# Patient Record
(demographics unavailable — no encounter records)

---

## 2022-07-20 LAB
INFLUENZA A: NOT DETECTED
INFLUENZA B: NOT DETECTED
SARS-COV-2: DETECTED

## 2022-10-19 NOTE — ED NOTES
ED Patient Summary       ;          [facility]  [address], [place], [address] [place] [address]  [phone]  Discharge Instructions (Patient)  Luara Yadav  :  2021                   MRN: [id number]                   FIN: [id number]  Reason For Visit: Fever; FEVER  Final Diagnosis: [id number]     Visit Date: 2022 06:01:00  Address: [address] [place] [id number]  Phone: [phone]     Emergency Department Providers:         Primary Physician:      [facility] would like to thank you for allowing us to assist you with your healthcare needs. The following includes patient education materials and information regarding your injury/illness. Follow-up Instructions: You were seen today on an emergency basis. Please contact your primary care doctor for a follow up appointment. If you received a referral to a specialist doctor, it is important you follow-up as instructed. It is important that you call your follow-up doctor to schedule and confirm the location of your next appointment. Your doctor may practice at multiple locations. The office location of your follow-up appointment may be different to the one written on your discharge instructions. If you do not have a primary care doctor, please call [phone] for help in finding a [place]. [facility] Provider. For help in finding a specialist doctor, please call [phone]. If your condition gets worse before your follow-up with your primary care doctor or specialist, please return to the Emergency Department. Coronavirus 2019 (COVID-19) Reminders:     Patients age 15 - 24, with parental consent, and over age 25 can make an appointment for a COVID-19 vaccine. Patients can contact their [facility] doctors' offices to schedule an appointment to receive the COVID-19 vaccine.  Patients who do not have a [place]. Alfonzo Cordero physician can call [phone] to schedule vaccination appointments. Follow Up Appointments:  Primary Care Provider:     Name: Valentina Gregorio     Phone: [phone]                 With: Address: When:   Follow up with primary care provider  Within 1 week   Comments:   Treat fever with ibuprofen and acetaminophen. Drink plenty of fluids. Follow-up with pediatrician if any further concerns. Return to [facility] if any change or worsening of condition. [address] SERVICES%>             Medications that have not changed  Other Medications  multivitamin with iron (Poly-Vi-Sol with Iron Drops) 1 Milliliter Oral (given by mouth) every day. Last Dose:____________________      Allergy Info: No Known Allergies     Discharge Additional Information          Discharge Patient 07/20/22 6:45:00 EDT      Patient Education Materials:        Ibuprofen Dosage Chart, Pediatric    Ibuprofen, also called Motrin? or Advil?, is a medicine used to relieve pain and fever in children. Before giving the medicine    Check the label on the bottle for the amount and strength (concentration) of ibuprofen. Determine the dosage by finding your child's weight below. The medicine can be given in liquid, chewable tablet, or standard tablet form. Each type may have a different concentration of medicine. Measure the dosage. To measure liquid, use the oral syringe or medicine cup that came with the bottle. Do not use household teaspoons or spoons. Do not give ibuprofen if your child is 7 months of age or younger unless instructed to do so by your child's health care provider. Dosage by weight    Weight: 12?17 lb (5.4?7.7 kg)     Infant concentrated drops (50 mg in 1.25 mL): 1.25 mL. Children's suspension liquid (100 mg in 5 mL): 2.5 mL. Children's or red-strength tablets or chewable tablets (100 mg tablets): Not recommended.       Weight: 18?23 lb (8.2?10.4 kg)     Infant concentrated drops (50 mg in 1.25 mL): 1.875 mL. Children's suspension liquid (100 mg in 5 mL): 4 mL. Children's or red-strength tablets or chewable tablets (100 mg tablets): Not recommended. Weight: 24?35 lb (10.9?15.9 kg)       Infant concentrated drops (50 mg in 1.25 mL): 2.5 mL. Children's suspension liquid (100 mg in 5 mL): 5 mL. Children's or red-strength tablets or chewable tablets (100 mg tablets): Not recommended. Weight: 36?47 lb (16.3?21.3 kg)       Infant concentrated drops (50 mg in 1.25 mL): 3.75 mL. Children's suspension liquid (100 mg in 5 mL): 7.5 mL. Children's or red-strength tablets or chewable tablets (100 mg tablets): Not recommended. Weight: 48?59 lb (21.8?26.8 kg)       Infant concentrated drops (50 mg in 1.25 mL): 5 mL. Children's suspension liquid (100 mg in 5 mL): 10 mL. Children's or red-strength tablets or chewable tablets (100 mg tablets): 2 tablets. Weight: 60?71 lb (27.2?32.2 kg)       Infant concentrated drops (50 mg in 1.25 mL): Not recommended. Children's suspension liquid (100 mg in 5 mL): 12.5 mL. Children's or red-strength tablets or chewable tablets (100 mg tablets): 2? tablets. Weight: 72? 95 lb (32.7?43.1 kg)       Infant concentrated drops (50 mg in 1.25 mL): Not recommended. Children's suspension liquid (100 mg in 5 mL): 15 mL. Children's or red-strength tablets or chewable tablets (100 mg tablets): 3 tablets. Weight: 96 lb and over (43.5 kg and over)     Infant concentrated drops (50 mg in 1.25 mL): Not recommended. Children's suspension liquid (100 mg in 5 mL): 20 mL. Children's or red-strength tablets or chewable tablets (100 mg tablets): 4 tablets. Follow these instructions at home:     Repeat dosage every 6?8 hours as needed, or as recommended by your child's health care provider. Do not give more than 4 doses in 24 hours.      Do not give your child aspirin unless you are told to do so by your child's pediatrician or cardiologist. Aspirin has been linked to a serious medical reaction called Reye's syndrome. Summary     Ibuprofen is a medicine used to relieve pain and fever in children. Determine the correct dosage for your child based on his or her weight. Repeat dosage every 6?8 hours as needed, or as recommended by your child's health care provider. Do not give more than 4 doses in 24 hours. This information is not intended to replace advice given to you by your health care provider. Make sure you discuss any questions you have with your health care provider. Document Revised: 2021 Document Reviewed: 04/06/2018  [employer] Patient Education ? [address].       Acetaminophen Dosage Chart, Pediatric    Acetaminophen, also called Tylenol?, is a medicine used to relieve pain and fever in children. Before giving the medicine    Check the label on the bottle for the amount and strength (concentration) of acetaminophen. Concentrated infant acetaminophen drops (80 mg per 1 mL) are no longer made or sold in the [place] but they are available in other countries including [place] Determine the dosage by finding your child's weight below. The medicine can be given in liquid, chewable tablet, or dissolving powder form. Each type may have a different concentration of medicine. Measure the dosage. To measure liquid, use the oral syringe or medicine cup that came with the bottle. Do not use household teaspoons or spoons. Do not give acetaminophen if your child is 16 weeks of age or younger unless instructed to do so by your child's health care provider. Dosage by weight    Weight: 6?11 lb (2.7?5 kg)     Suspension liquid (160 mg per 5 mL): 1.25 mL. Chewable tablets (160 mg tablets): Not recommended. Dissolving powder in packets (160 mg per powder): Not recommended.       Weight 12?17 lb (5.4?7.7 kg) Suspension liquid (160 mg per 5 mL): 2.5 mL. Chewable tablets (160 mg tablets): Not recommended. Dissolving powder in packets (160 mg per powder): Not recommended. Weight 18?23 lb (8.2?10.4 kg)     Suspension liquid (160 mg per 5 mL): 3.75 mL. Chewable tablets (160 mg tablets): Not recommended. Dissolving powder in packets (160 mg per powder): Not recommended. Weight: 24?35 lb (10.9?15.9 kg)       Suspension liquid (160 mg per 5 mL): 5 mL. Chewable tablets (160 mg tablets): 1 tablet. Dissolving powder in packets (160 mg per powder): Not recommended. Weight: 36?47 lb (16.3?21.3 kg)       Suspension liquid (160 mg per 5 mL): 7.5 mL. Chewable tablets (160 mg tablets): 1? tablets. Dissolving powder in packets (160 mg per powder): Not recommended. Weight: 48?59 lb (21.8?26.8 kg)       Suspension liquid (160 mg per 5 mL): 10 mL. Chewable tablets (160 mg tablets): 2 tablets. Dissolving powder in packets (160 mg per powder): 2 powders. Weight: 60?71 lb (27.2?32.2 kg)       Suspension liquid (160 mg per 5 mL): 12.5 mL. Chewable tablets (160 mg tablets): 2? tablets. Dissolving powder in packets (160 mg per powder): 2 powders. Weight: 72? 95 lb (32.7?43.1 kg)       Suspension liquid (160 mg per 5 mL): 15 mL. Chewable tablets (160 mg tablets): 3 tablets. Dissolving powder in packets (160 mg per powder): 3 powders. Weight: 96 lb and over (43.6 kg and over)     Suspension liquid (160 mg per 5 mL): 20 mL. Chewable tablets (160 mg tablets): 4 tablets. Dissolving powder in packets (160 mg per powder): Not recommended. Follow these instructions at home:     Repeat the dosage every 4?6 hours as needed, or as recommended by your child's health care provider. Do not give more than 5 doses in 24 hours. Do not give more than one medicine containing acetaminophen at the same time.  Taking too much acetaminophen can lead to significant problems such as liver damage. Do not give your child aspirin unless you are told to do so by your child's pediatrician or cardiologist. Aspirin has been linked to a serious medical reaction called Reye's syndrome. Summary     Acetaminophen is commonly used to relieve pain and fever in children. Determine the correct dosage for your child based on his or her weight. Do not give more than one medicine containing acetaminophen at the same time. Repeat the dosage every 4?6 hours as needed, or as recommended by your child's health care provider. Do not give more than 5 doses in 24 hours. This information is not intended to replace advice given to you by your health care provider. Make sure you discuss any questions you have with your health care provider. Document Revised: 2021 Document Reviewed: 08/01/2018  Elsevier Patient Education ? [facility] [facility] Testing, Precautions, & Infection     x Your COVID-19 lab test result is POSITIVE. Your COVID-19 lab result is positive (detected) for the COVID-19 infection. Please follow all other discharge instructions given to you by your healthcare provider. Please follow the quarantine/isolation instructions on page 2.          **** The quickest way to view or print your own test result is by the Patient Portal at [email or web address] see below for further Patient Portal details. Note: In reviewing your COVID-19 result on the patient portal (under the results tab) you may notice a variation in the test name that includes, Coronavirus, cNoV, Corona, SARS, or COV2. These all relate to a COVID-19 test that you had performed at a [address] location.          CDC Quarantine/Isolation Guidelines  q If you were exposed to COVID-19 and are NOT UP-TO-DATE on COVID-19 vaccinations  · Stay home and quarantine until you receive your test result  If negative, you may return to normal activities  If your test result is positive, follow the instructions below  q If you were exposed to COVID-19 and are UP-TO-DATE on COVID-19 vaccinations  · You do not need to stay home unless you develop symptoms  · If you have symptoms, isolate at home until you receive your test result  If negative, you may return to normal activities  If your test result is positive, follow the instructions below  q If you were exposed to COVID-19 and had confirmed COVID-19 within the   past 90 days  · You do not need to stay home unless you develop symptoms  · If you have symptoms, isolate at home until you receive your test result  If negative, you may return to normal activities  If your test result is positive, follow the instructions below    q If your COVID-19 test is POSITIVE  · Stay home and isolate from others for at least 5 days  · You may end your isolation after 5 days if your symptoms are improving and you have gone at least 24 hours without a fever (without taking Tylenol, ibuprofen, or any other medications to reduce your fever)continue your isolation for a total of 10 days if your symptoms are not improving or you still have a fever after 5 days  · You should continue to wear a mask for 10 full days any time you are around others (inside or outside of your home)  · Avoid sharing confined spaces with others as much as possible. If you live in your home with other people, consider keeping a separate bedroom and bathroom just for your use    How to Access the [facility] Patient Portal  1. Go to: [email or web address]  2. You want the option of:  [facility]   If you do not have an account and are visiting the portal for the first time, select Sign up now. If you already have an account, select Log into [facility].        3. If you are signing up for a new account you will fill out a Self-Enrollment for [facility] page in which you will securely enter your first, last name, date of birth, social security number, and an identity verification prompt. *Tip:  when filling out the Self-Enrollment for [facility] enter your name using the same spelling that is on file with your physicians office or hospital. Once this page is filled out, hit the purple Next button at the bottom of the page. 4. Verify your information and click the purple Next Create Your Account button. 5. After identity verification, you will create your username and password for your [facility] Patient Portal. You will then click the green Create Account button. 6. Once your account has been created, you will be taken back to a login screen. Log in with the username and password that you created in step 5. If creating an account for a minor child, contact Medical Records at [phone] to receive a personal email invitation to enroll your child. Medical records is open M-F 8am-4:30pm. If you contact them after hours, select option 3 to leave a message and you will receive a call back the next business day. ! If you are having issues logging into or accessing your account, contact [employer] at [phone] available 7 days a week, 24 hours a day.           CDC COVID-19 Resources  For more information, visit  [email or web address]                    (Scan these codes with an Apple or Android device)                       CDC Quarantine/Isolation                       What to do when                           COVID-19 Quarantine           Guidelines                                        when you are sick                                  VS Isolation What is COVID-19? COVID-19 stands for \"coronavirus disease 2019. \" It is caused by a virus called SARS-CoV-2. The virus first appeared in late 2019 and quickly spread around the world. People with COVID-19 can have fever, cough, trouble breathing, and other symptoms. Problems with breathing happen when the infection affects the lungs and causes pneumonia. Most people who get COVID-19 will not get severely ill. But some do. In many areas, people have been told to stay home and away from other people. This is to try to slow the spread of the virus. How is COVID-19 Spread? The virus that causes COVID-19 mainly spreads from person to person. This usually happens when an infected person coughs, sneezes, or talks near other people. The virus can be passed easily between people who live together. But it can also spread at gatherings where people are talking close together, shaking hands, hugging, sharing food, or even singing together. Doctors also think it is possible to get sick if you touch a surface that has the virus on it and then touch your mouth, nose, or eyes. A person can be infected, and spread the virus to others, even without having any symptoms. This is why keeping people apart is one of the best ways to slow the spread. It is also possible for the virus to spread from an infected person to an animal, like a pet. But this seems to be uncommon. There is no evidence that a person could get the virus from a pet. Experts do not think the virus is spread through food like some other viruses. There is also no evidence that it can be spread through the water in a pool or hot tub. But because the virus can spread when people are close together, things like swimming in a crowded pool are still risky. What are the Symptoms of COVID-19? Symptoms usually start 4 or 5 days after a person is infected with the virus. But in some people, it can take up to 2 weeks for symptoms to appear.  Some people never show symptoms at all. When symptoms do happen, they can include:  · Fever    Shaking chills  Problems with sense of smell   · Cough               Muscle aches           or taste  · Trouble Breathing  Headache  · Feeling tired    Sore throat  Some people have digestive problems like nausea or diarrhea. There have also been some reports of rashes or other skin symptoms. For example, some people with COVID-19 get reddish-purple spots on their fingers or toes. But it's not clear why or how often this happens. For most people, symptoms will get better within a few weeks. But in others, COVID-19 can lead to serious problems like pneumonia, not getting enough oxygen, heart problems, or even death. This risk gets higher as people get older. It is also higher in people who have other health problems like serious heart disease, chronic kidney disease, type 2 diabetes, chronic obstructive pulmonary disease (COPD), sickle cell disease, or obesity. People who have a weak immune system for other reasons (for example, HIV infection or certain medicines), asthma, cystic fibrosis, type 1 diabetes, or high blood pressure might also be at higher risk for serious problems. What should I do if I have symptoms? If you have a fever, cough, trouble breathing, or other symptoms of COVID-19, call your doctor or nurse. They will ask about your symptoms. They might also ask about any recent travel and whether you have been around anyone who might be sick. If your symptoms are not severe, it is best to call before you go in. The staff can tell you what to do and whether you need to be seen in person. Many people with only mild symptoms should stay home and avoid other people until they get better. If you do need to go to the clinic or hospital, cover your nose and mouth with cloth. This helps protect other people. The staff might also have you wait someplace away from other people.   If you are severely ill and need to go to the clinic or hospital right away, you should still call ahead if possible. This way the staff can care for you while taking steps to protect others. If you think you are having a medical emergency, dial 9-1-1.          ---------------------------------------------------------------------------------------------------------------------  [facility] allows patients to review your COVID and other test results as well as discharge documents from any [facility], Emergency Department, surgical center or outpatient lab. Test results are typically available 36 hours after the test is completed. [address] encourages you to self-enroll in the [facility] Patient Portal.     To begin your self-enrollment process, please visit [email or web address] Under [facility], click on Sign up now. NOTE: You must be 16 years and older to use [facility] Self-Enroll online. If you are a parent, caregiver, or guardian; you need an invite to access your childs or dependents health records. To obtain an invite, contact the Medical Records department at [phone] Monday through Friday, 8-4:30, select option 3 . If we receive your call afterhours, we will return your call the next business day. If you have issues trying to create or access your account, contact [employer] at [phone] available 7 days a week 24 hours a day.      Comment:

## 2022-10-19 NOTE — ED NOTES
ED Patient Education Note     ;Patient Education Materials Follows:                  COVID-19 Testing, Precautions, & Infection     x Your COVID-19 lab test result is POSITIVE. Your COVID-19 lab result is positive (detected) for the COVID-19 infection. Please follow all other discharge instructions given to you by your healthcare provider. Please follow the quarantine/isolation instructions on page 2.          **** The quickest way to view or print your own test result is by the Patient Portal at [email or web address] see below for further Patient Portal details. Note: In reviewing your COVID-19 result on the patient portal (under the results tab) you may notice a variation in the test name that includes, Coronavirus, cNoV, Corona, SARS, or COV2. These all relate to a COVID-19 test that you had performed at a [address] location.          CDC Quarantine/Isolation Guidelines  q If you were exposed to COVID-19 and are NOT UP-TO-DATE on COVID-19 vaccinations  · Stay home and quarantine until you receive your test result  If negative, you may return to normal activities  If your test result is positive, follow the instructions below  q If you were exposed to COVID-19 and are UP-TO-DATE on COVID-19 vaccinations  · You do not need to stay home unless you develop symptoms  · If you have symptoms, isolate at home until you receive your test result  If negative, you may return to normal activities  If your test result is positive, follow the instructions below  q If you were exposed to COVID-19 and had confirmed COVID-19 within the   past 90 days  · You do not need to stay home unless you develop symptoms  · If you have symptoms, isolate at home until you receive your test result  If negative, you may return to normal activities  If your test result is positive, follow the instructions below    q If your COVID-19 test is POSITIVE  · Stay home and isolate from others for at least 5 days  · You may end your isolation after 5 days if your symptoms are improving and you have gone at least 24 hours without a fever (without taking Tylenol, ibuprofen, or any other medications to reduce your fever)continue your isolation for a total of 10 days if your symptoms are not improving or you still have a fever after 5 days  · You should continue to wear a mask for 10 full days any time you are around others (inside or outside of your home)  · Avoid sharing confined spaces with others as much as possible. If you live in your home with other people, consider keeping a separate bedroom and bathroom just for your use    How to Access the [facility] Patient Portal  1. Go to: [email or web address]  2. You want the option of:  [facility]   If you do not have an account and are visiting the portal for the first time, select Sign up now. If you already have an account, select Log into [facility]. 3. If you are signing up for a new account you will fill out a Self-Enrollment for [facility] page in which you will securely enter your first, last name, date of birth, social security number, and an identity verification prompt. *Tip:  when filling out the Self-Enrollment for [facility] enter your name using the same spelling that is on file with your physicians office or hospital. Once this page is filled out, hit the purple Next button at the bottom of the page. 4. Verify your information and click the purple Next Create Your Account button. 5. After identity verification, you will create your username and password for your [facility] Patient Portal. You will then click the green Create Account button. 6. Once your account has been created, you will be taken back to a login screen. Log in with the username and password that you created in step 5.    If creating an account for a minor child, contact Medical Records at [phone] to receive a where people are talking close together, shaking hands, hugging, sharing food, or even singing together. Doctors also think it is possible to get sick if you touch a surface that has the virus on it and then touch your mouth, nose, or eyes. A person can be infected, and spread the virus to others, even without having any symptoms. This is why keeping people apart is one of the best ways to slow the spread. It is also possible for the virus to spread from an infected person to an animal, like a pet. But this seems to be uncommon. There is no evidence that a person could get the virus from a pet. Experts do not think the virus is spread through food like some other viruses. There is also no evidence that it can be spread through the water in a pool or hot tub. But because the virus can spread when people are close together, things like swimming in a crowded pool are still risky. What are the Symptoms of COVID-19? Symptoms usually start 4 or 5 days after a person is infected with the virus. But in some people, it can take up to 2 weeks for symptoms to appear. Some people never show symptoms at all. When symptoms do happen, they can include:  · Fever    Shaking chills  Problems with sense of smell   · Cough               Muscle aches           or taste  · Trouble Breathing  Headache  · Feeling tired    Sore throat  Some people have digestive problems like nausea or diarrhea. There have also been some reports of rashes or other skin symptoms. For example, some people with COVID-19 get reddish-purple spots on their fingers or toes. But it's not clear why or how often this happens. For most people, symptoms will get better within a few weeks. But in others, COVID-19 can lead to serious problems like pneumonia, not getting enough oxygen, heart problems, or even death. This risk gets higher as people get older.  It is also higher in people who have other health problems like serious heart disease, chronic kidney disease, type 2 diabetes, chronic obstructive pulmonary disease (COPD), sickle cell disease, or obesity. People who have a weak immune system for other reasons (for example, HIV infection or certain medicines), asthma, cystic fibrosis, type 1 diabetes, or high blood pressure might also be at higher risk for serious problems. What should I do if I have symptoms? If you have a fever, cough, trouble breathing, or other symptoms of COVID-19, call your doctor or nurse. They will ask about your symptoms. They might also ask about any recent travel and whether you have been around anyone who might be sick. If your symptoms are not severe, it is best to call before you go in. The staff can tell you what to do and whether you need to be seen in person. Many people with only mild symptoms should stay home and avoid other people until they get better. If you do need to go to the clinic or hospital, cover your nose and mouth with cloth. This helps protect other people. The staff might also have you wait someplace away from other people. If you are severely ill and need to go to the clinic or hospital right away, you should still call ahead if possible. This way the staff can care for you while taking steps to protect others. If you think you are having a medical emergency, dial [phone]. Pediatrics     Ibuprofen Dosage Chart, Pediatric    Ibuprofen, also called Motrin? or Advil?, is a medicine used to relieve pain and fever in children. Before giving the medicine    Check the label on the bottle for the amount and strength (concentration) of ibuprofen. Determine the dosage by finding your child's weight below. The medicine can be given in liquid, chewable tablet, or standard tablet form. Each type may have a different concentration of medicine. Measure the dosage. To measure liquid, use the oral syringe or medicine cup that came with the bottle. Do not use household teaspoons or spoons.     Do not give ibuprofen if your child is 7 months of age or younger unless instructed to do so by your child's health care provider. Dosage by weight    Weight: 12?17 lb (5.4?7.7 kg)     Infant concentrated drops (50 mg in 1.25 mL): 1.25 mL. Children's suspension liquid (100 mg in 5 mL): 2.5 mL. Children's or red-strength tablets or chewable tablets (100 mg tablets): Not recommended. Weight: 18?23 lb (8.2?10.4 kg)     Infant concentrated drops (50 mg in 1.25 mL): 1.875 mL. Children's suspension liquid (100 mg in 5 mL): 4 mL. Children's or red-strength tablets or chewable tablets (100 mg tablets): Not recommended. Weight: 24?35 lb (10.9?15.9 kg)       Infant concentrated drops (50 mg in 1.25 mL): 2.5 mL. Children's suspension liquid (100 mg in 5 mL): 5 mL. Children's or red-strength tablets or chewable tablets (100 mg tablets): Not recommended. Weight: 36?47 lb (16.3?21.3 kg)       Infant concentrated drops (50 mg in 1.25 mL): 3.75 mL. Children's suspension liquid (100 mg in 5 mL): 7.5 mL. Children's or red-strength tablets or chewable tablets (100 mg tablets): Not recommended. Weight: 48?59 lb (21.8?26.8 kg)       Infant concentrated drops (50 mg in 1.25 mL): 5 mL. Children's suspension liquid (100 mg in 5 mL): 10 mL. Children's or red-strength tablets or chewable tablets (100 mg tablets): 2 tablets. Weight: 60?71 lb (27.2?32.2 kg)       Infant concentrated drops (50 mg in 1.25 mL): Not recommended. Children's suspension liquid (100 mg in 5 mL): 12.5 mL. Children's or red-strength tablets or chewable tablets (100 mg tablets): 2? tablets. Weight: 72? 95 lb (32.7?43.1 kg)       Infant concentrated drops (50 mg in 1.25 mL): Not recommended. Children's suspension liquid (100 mg in 5 mL): 15 mL. Children's or red-strength tablets or chewable tablets (100 mg tablets): 3 tablets.       Weight: 96 lb and over (43.5 kg and over) Infant concentrated drops (50 mg in 1.25 mL): Not recommended. Children's suspension liquid (100 mg in 5 mL): 20 mL. Children's or red-strength tablets or chewable tablets (100 mg tablets): 4 tablets. Follow these instructions at home:     Repeat dosage every 6?8 hours as needed, or as recommended by your child's health care provider. Do not give more than 4 doses in 24 hours. Do not give your child aspirin unless you are told to do so by your child's pediatrician or cardiologist. Aspirin has been linked to a serious medical reaction called Reye's syndrome. Summary     Ibuprofen is a medicine used to relieve pain and fever in children. Determine the correct dosage for your child based on his or her weight. Repeat dosage every 6?8 hours as needed, or as recommended by your child's health care provider. Do not give more than 4 doses in 24 hours. This information is not intended to replace advice given to you by your health care provider. Make sure you discuss any questions you have with your health care provider. Document Revised: 2021 Document Reviewed: 04/06/2018  [employer] Patient Education ? [address]         Acetaminophen Dosage Chart, Pediatric    Acetaminophen, also called Tylenol?, is a medicine used to relieve pain and fever in children. Before giving the medicine    Check the label on the bottle for the amount and strength (concentration) of acetaminophen. Concentrated infant acetaminophen drops (80 mg per 1 mL) are no longer made or sold in the [place] but they are available in other countries including [place] Determine the dosage by finding your child's weight below. The medicine can be given in liquid, chewable tablet, or dissolving powder form. Each type may have a different concentration of medicine. Measure the dosage. To measure liquid, use the oral syringe or medicine cup that came with the bottle.  Do not use household teaspoons or spoons. Do not give acetaminophen if your child is 16 weeks of age or younger unless instructed to do so by your child's health care provider. Dosage by weight    Weight: 6?11 lb (2.7?5 kg)     Suspension liquid (160 mg per 5 mL): 1.25 mL. Chewable tablets (160 mg tablets): Not recommended. Dissolving powder in packets (160 mg per powder): Not recommended. Weight 12?17 lb (5.4?7.7 kg)     Suspension liquid (160 mg per 5 mL): 2.5 mL. Chewable tablets (160 mg tablets): Not recommended. Dissolving powder in packets (160 mg per powder): Not recommended. Weight 18?23 lb (8.2?10.4 kg)     Suspension liquid (160 mg per 5 mL): 3.75 mL. Chewable tablets (160 mg tablets): Not recommended. Dissolving powder in packets (160 mg per powder): Not recommended. Weight: 24?35 lb (10.9?15.9 kg)       Suspension liquid (160 mg per 5 mL): 5 mL. Chewable tablets (160 mg tablets): 1 tablet. Dissolving powder in packets (160 mg per powder): Not recommended. Weight: 36?47 lb (16.3?21.3 kg)       Suspension liquid (160 mg per 5 mL): 7.5 mL. Chewable tablets (160 mg tablets): 1? tablets. Dissolving powder in packets (160 mg per powder): Not recommended. Weight: 48?59 lb (21.8?26.8 kg)       Suspension liquid (160 mg per 5 mL): 10 mL. Chewable tablets (160 mg tablets): 2 tablets. Dissolving powder in packets (160 mg per powder): 2 powders. Weight: 60?71 lb (27.2?32.2 kg)       Suspension liquid (160 mg per 5 mL): 12.5 mL. Chewable tablets (160 mg tablets): 2? tablets. Dissolving powder in packets (160 mg per powder): 2 powders. Weight: 72? 95 lb (32.7?43.1 kg)       Suspension liquid (160 mg per 5 mL): 15 mL. Chewable tablets (160 mg tablets): 3 tablets. Dissolving powder in packets (160 mg per powder): 3 powders. Weight: 96 lb and over (43.6 kg and over)     Suspension liquid (160 mg per 5 mL): 20 mL.      Chewable tablets (160 mg tablets): 4 tablets. Dissolving powder in packets (160 mg per powder): Not recommended. Follow these instructions at home:     Repeat the dosage every 4?6 hours as needed, or as recommended by your child's health care provider. Do not give more than 5 doses in 24 hours. Do not give more than one medicine containing acetaminophen at the same time. Taking too much acetaminophen can lead to significant problems such as liver damage. Do not give your child aspirin unless you are told to do so by your child's pediatrician or cardiologist. Aspirin has been linked to a serious medical reaction called Reye's syndrome. Summary     Acetaminophen is commonly used to relieve pain and fever in children. Determine the correct dosage for your child based on his or her weight. Do not give more than one medicine containing acetaminophen at the same time. Repeat the dosage every 4?6 hours as needed, or as recommended by your child's health care provider. Do not give more than 5 doses in 24 hours. This information is not intended to replace advice given to you by your health care provider. Make sure you discuss any questions you have with your health care provider. Document Revised: 2021 Document Reviewed: 08/01/2018  [employer] Patient Education ?  [address] [address].

## 2022-10-19 NOTE — ED NOTES
ED Triage Note       ED Triage Pediatric Entered On:  7/20/2022 6:09 EDT    Performed On:  7/20/2022 6:05 EDT by Rosibel BRINK               Triage Pediatric 8.6.19   Chief Complaint :   per grandma, pt has been burning up all night, not sleeping well, some congestion. tylenol at midnight   [employer] Mode of Arrival :   Private vehicle   Infectious Disease Documentation :   Document assessment   Minor Treatment Consent :   Obtained   Accompanied By :   Chidi Olmedo   Pregnancy Status :   N/A   Temperature Axillary :   38.6 degC(Converted to: 101.5 degF)  (>HHI)    Heart Rate Monitored :   186 bpm (HI)    Respiratory Rate :   34 br/min (HI)    SpO2 :   100 %   Oxygen Therapy :   Room air   Numeric Rating Pain Scale :   0 = No pain   ED Peds Weight :   Document assessment   Rosibel BRINK - 7/20/2022 6:05 EDT   DCP GENERIC CODE   Tracking Acuity :   3   Tracking Group :   [address] [place] Mayra Hutchinsonsol BRINK - 7/20/2022 6:05 EDT   ED General Section :   Document assessment   ED Allergies Section :   Document assessment   ED Reason for Visit Section :   Document assessment   ED Quick Assessment :   Patient appears awake, alert, oriented to baseline. Skin warm and dry. Moves all extremities. Respiration even and unlabored. Appears in no apparent distress. Rosibel BRINK - 7/20/2022 6:05 EDT   ID Risk Screen Symptoms   Recent Travel History :   No recent travel   TB Symptom Screen :   No symptoms   Last 90 days COVID-19 ID :   No   Close Contact with COVID-19 ID :   No   Last 14 days COVID-19 ID :   No   Elias BRINK - 7/20/2022 6:05 EDT   Allergies   (As Of: 7/20/2022 06:09:51 EDT)   Allergies (Active)   No Known Allergies  Estimated Onset Date:   Unspecified ; Created By:   Pearl Finney RN, Katey BROWN; Reaction Status:   Active ; Category:   Drug ; Substance:   No Known Allergies ; Type:    Allergy ; Updated By:   Kathrine Cochran; Reviewed Date:   7/20/2022 6:09 EDT        Consent to Treat a Minor   Minor Treatment Consent Given By :   Guardian   Method of Consent for Minor Treatment : In person   [facility] R-RN - 7/20/2022 6:05 EDT   Psycho-Social   Last 3 mo, thoughts killing self/others :   NA - Pediatric pt OR unable to answer   Right click within box for Suspected Abuse policy link. :   None   Feels Safe Where Live :   Unable to obtain   ED Behavioral Activity Rating Scale :   4 - Quiet and awake (normal level of activity)   Elias BRINK - 7/20/2022 6:05 EDT   ED Reason for Visit   (As Of: 7/20/2022 06:09:51 EDT)   Diagnoses(Active)    Fever  Date:   7/20/2022 ; Diagnosis Type:   Reason For Visit ; Confirmation:   Complaint of ; Clinical Dx:    Fever ; Classification:   Medical ; Clinical Service:   Emergency medicine ; Code:   PNED ; Probability:   0 ; Diagnosis Code:   [id number]        Peds Weight 8.6.19   Dosing Weight Obtained By :   Measured   Weight Dosing :   11.57 kg(Converted to: 25 lb 8 oz)    Elias BRINK  7/20/2022 6:05 EDT

## 2022-10-19 NOTE — DISCHARGE SUMMARY
ED Clinical Summary                         [facility]  [address]  [place], [place], [address] [phone]           PERSON INFORMATION  Name: Edita Christensen Age:  15 Months : 2021   Sex: Male Language: English PCP: Ruta Krabbe   Marital Status:  Single Phone: [phone] Med Service: MED-Medicine   MRN:  [id number] Acct# [[id number]] Arrival: 2022 06:01:00   Visit Reason: Fever; FEVER Acuity: 3 LOS: 000 00:54   Address:       [facility] [address]  Diagnosis:      [id number]  Printed Prescriptions: Allergies      No Known Allergies      Medications Administered During Visit:                  Medication Dose Route   ibuprofen 115.7 mg Oral       Patient Medication List:              multivitamin with iron (Poly-Vi-Sol with Iron Drops) 1 Milliliter Oral (given by mouth) every day. Major Tests and Procedures: The following procedures and tests were performed during your ED visit. COMMONPROCEDURES%>  COMMON PROCEDURESCOMMENTS%>          Laboratory Orders  Name Status Details   [facility] Completed Nasopharyngeal Swab, Stat, ST - Stat, 22 6:11:00 EDT, 22 6:11:00 EDT, Nurse collect, BURNS, Nikki Carrel, Print label Y/N               Radiology Orders  No radiology orders were placed. Patient Care Orders  Name Status Details   COVID-19 Status Ordered 22 6:11:37 EDT, NOT VALID FOR pharmacy, laboratory, radiology. , 22 6:11:37 EDT, COVID-19 Detected   Discharge Patient Ordered 22 6:45:00 EDT   ED Assessment Pediatric Completed 22 6:09:52 EDT, 22 6:09:52 EDT   ED Secondary Triage Completed 22 6:09:52 EDT, 22 6:09:52 EDT   ED Triage Pediatric Completed 22 6:01:29 EDT, 22 6:01:29 EDT   Notify Provider Completed 22 6:11:37 EDT, This message can only be seen by Nursing, it is not visible to Pharmacy, Laboratory, or Radiology. , 22 6:11:37 EDT   Patient Isolation Ordered 07/20/22 6:11:00 EDT, Contact and Airborne, Constant Indicator             PROVIDER INFORMATION               Provider Role Assigned Anuja Conrad [facility] ED Provider 7/20/2022 98:79:63    [facility] ED Nurse 7/20/2022 06:10:27        Attending Physician:  Nicholas VALLE     Admit Doc  HOLLOWAYBETTE     Consulting Doc       VITALS INFORMATION  Vital Sign Triage Latest   Temp Oral ORAL_1%> ORAL%>   Temp Temporal TEMPORAL_1%> TEMPORAL%>   Temp Intravascular INTRAVASCULAR_1%> INTRAVASCULAR%>   Temp Axillary AXILLARY_1%>38.6 degC AXILLARY%>37.2 degC   Temp Rectal RECTAL_1%> RECTAL%>   02 Sat 100 % 97 %   Respiratory Rate RATE_1%>34 br/min RATE%>26 br/min   Peripheral Pulse Rate PULSE RATE_1%>185 bpm PULSE RATE%>185 bpm   Apical Heart Rate HEART RATE_1%> HEART RATE%>   Blood Pressure BLOOD PRESSURE_1%>/ BLOOD PRESSURE_1%> BLOOD PRESSURE%> / BLOOD PRESSURE%>                 Immunizations      No Immunizations Documented This Visit          DISCHARGE INFORMATION   Discharge Disposition: H Outpt-Sent Home   Discharge Location:    Home   Discharge Date and Time:    7/20/2022 06:55:11   ED Checkout Date and Time:    7/20/2022 06:55:11     DEPART REASON INCOMPLETE INFORMATION               Depart Action Incomplete Reason   Interactive View/I&O MD Decision to leave incomplete               Problems      No Problems Documented              Smoking Status      No Smoking Status Documented         PATIENT EDUCATION INFORMATION  Instructions:       Ibuprofen Dosage Chart, Pediatric; Acetaminophen Dosage Chart, Pediatric;   COVID Discharge Instructions positive results (Custom)     Follow up:                    With: Address: When:   Follow up with primary care provider  Within 1 week   Comments:   Treat fever with ibuprofen and acetaminophen. Drink plenty of fluids. Follow-up with pediatrician if any further concerns.  [facility] any change or worsening of condition. ED PROVIDER DOCUMENTATION     Patient:   Mihaela Anand             MRN: [id number]            FIN: [id number]               Age:   12 months     Sex:  Male     :  2021   Associated Diagnoses:   COVID-19   Author:   Valery HOLLOWAY      Basic Information   Time seen: Provider Seen (ST)   ED Provider/Time:    BETTE HOLLOWAY / 2022 06:08  . Additional information: Chief Complaint from Nursing Triage Note   Chief Complaint  Chief Complaint: per grandma, pt has been burning up all night, not sleeping well, some congestion. tylenol at midnight (22 06:05:00). History of Present Illness   The patient presents with fever. The onset was 1  days ago. Patient is a 15month-old male who presents in the company of his grandmother. 1 to 2-day history of nasal congestion, cough, runny nose. Last night began running fever. No definitive sick contacts. No difficulty breathing. Oxygenating well. Last dose with antipyretics greater than 6 hours ago. No skin rash. No vomiting. No complaints of abdominal pain. .        Review of Systems   Constitutional symptoms:  Fever. Skin symptoms:  Negative except as documented in HPI. Eye symptoms:  Negative except as documented in HPI. ENMT symptoms:  Nasal congestion. Respiratory symptoms:  Negative except as documented in HPI. Cardiovascular symptoms:  Negative except as documented in HPI. Gastrointestinal symptoms:  Negative except as documented in HPI. Genitourinary symptoms   Musculoskeletal symptoms:  Negative except as documented in HPI. Neurologic symptoms:  Negative except as documented in HPI. Psychiatric symptoms:  Negative except as documented in HPI. Endocrine symptoms:  Negative except as documented in HPI. Hematologic/Lymphatic symptoms:  Negative except as documented in HPI. Allergy/immunologic symptoms:  Negative except as documented in HPI. Health Status   Allergies: Allergic Reactions (Selected)  No Known Allergies. Medications:  (Selected)   Documented Medications  Documented  Poly-Vi-Sol with Iron Drops: 1 mL, Oral, Daily, 0 Refill(s). Past Medical/ Family/ Social History   Surgical history:    No active procedure history items have been selected or recorded., Reviewed as documented in chart. Family history: Reviewed as documented in chart. Social history: Reviewed as documented in chart. Problem list:    No qualifying data available  , per nurse's notes. Physical Examination               Vital Signs   Vital Signs   7/20/2022 6:11 EDT Peripheral Pulse Rate 185 bpm  HI    Heart Rate Monitored 186 bpm  HI    Respiratory Rate 32 br/min  HI    SpO2 100 %   7/20/2022 6:05 EDT Temperature Axillary 38.6 degC  >HHI    Heart Rate Monitored 186 bpm  HI    Respiratory Rate 34 br/min  HI    SpO2 100 %   . Measurements   7/20/2022 6:05 EDT       Weight Dosing             11.57 kg  . Basic Oxygen Information   7/20/2022 6:11 EDT Oxygen Therapy Room air    SpO2 100 %   7/20/2022 6:05 EDT Oxygen Therapy Room air    SpO2 100 %   . General:  Alert, no acute distress. Developmental milestones:  13 - 15 months. Skin:  Warm, dry, pink. Head:  Normocephalic, atraumatic. Neck:  Supple, trachea midline, no tenderness. Eye:  Pupils are equal, round and reactive to light, extraocular movements are intact, normal conjunctiva, vision grossly normal.    Ears, nose, mouth and throat:  Tympanic membranes clear, oral mucosa moist, Bilateral serous otitis media. No erythema. .    Cardiovascular:  Regular rate and rhythm, Normal peripheral perfusion. Respiratory:  Lungs are clear to auscultation, respirations are non-labored. Chest wall:  No tenderness, No deformity. Back:  Nontender, Normal range of motion, Normal alignment. Musculoskeletal:  Normal ROM, normal strength, no tenderness, no swelling, no deformity. Gastrointestinal:  Soft, Nontender, Non distended, Normal bowel sounds. Neurological:  No focal neurological deficit observed, normal sensory observed, normal motor observed. Lymphatics:  No lymphadenopathy. Psychiatric:  Cooperative, appropriate mood & affect, Not normal judgment,       Medical Decision Making   Notes:  7/20/2022 06:43:53: Reexam, child is sleeping. Very stable. Oxygenating well. Positive for COVID-19. Will discharge home with supportive care. .      Reexamination/ Reevaluation   Vital signs   Basic Oxygen Information   7/20/2022 6:11 EDT Oxygen Therapy Room air    SpO2 100 %   7/20/2022 6:05 EDT Oxygen Therapy Room air    SpO2 100 %         Impression and Plan   Diagnosis   COVID-19 ([id number] U07.1, Discharge, Medical)   Plan   Condition: Improved. Disposition: Discharged: to home. Patient was given the following educational materials:   COVID Discharge Instructions positive results (Custom), Acetaminophen Dosage Chart, Pediatric, Ibuprofen Dosage Chart, Pediatric. Follow up with: Follow up with primary care provider Within 1 week Treat fever with ibuprofen and acetaminophen. Drink plenty of fluids. Follow-up with pediatrician if any further concerns. Return to [facility] if any change or worsening of condition. .    Counseled: Family, Friend, Regarding diagnostic results, Regarding treatment plan. Notes: This note was created using voice recognition software and may contain typographic errors missed during final review. The intent is to have a complete and accurate medical record, though some errors will occur due to the nature of the software and program.    As a valued partner in the safety effort, if you have noticed factual errors, please complete the health information amendment/correct form or call the [address] [place] [address] at [phone]. [facility]

## 2022-10-19 NOTE — ED PROVIDER NOTES
Allergies. Medications:  (Selected)   Documented Medications  Documented  Poly-Vi-Sol with Iron Drops: 1 mL, Oral, Daily, 0 Refill(s). Past Medical/ Family/ Social History   Surgical history:    No active procedure history items have been selected or recorded., Reviewed as documented in chart. Family history: Reviewed as documented in chart. Social history: Reviewed as documented in chart. Problem list:    No qualifying data available  , per nurse's notes. Physical Examination               Vital Signs   Vital Signs   7/20/2022 6:11 EDT Peripheral Pulse Rate 185 bpm  HI    Heart Rate Monitored 186 bpm  HI    Respiratory Rate 32 br/min  HI    SpO2 100 %   7/20/2022 6:05 EDT Temperature Axillary 38.6 degC  >HHI    Heart Rate Monitored 186 bpm  HI    Respiratory Rate 34 br/min  HI    SpO2 100 %   . Measurements   7/20/2022 6:05 EDT       Weight Dosing             11.57 kg  . Basic Oxygen Information   7/20/2022 6:11 EDT Oxygen Therapy Room air    SpO2 100 %   7/20/2022 6:05 EDT Oxygen Therapy Room air    SpO2 100 %   . General:  Alert, no acute distress. Developmental milestones:  13 - 15 months. Skin:  Warm, dry, pink. Head:  Normocephalic, atraumatic. Neck:  Supple, trachea midline, no tenderness. Eye:  Pupils are equal, round and reactive to light, extraocular movements are intact, normal conjunctiva, vision grossly normal.    Ears, nose, mouth and throat:  Tympanic membranes clear, oral mucosa moist, Bilateral serous otitis media. No erythema. .    Cardiovascular:  Regular rate and rhythm, Normal peripheral perfusion. Respiratory:  Lungs are clear to auscultation, respirations are non-labored. Chest wall:  No tenderness, No deformity. Back:  Nontender, Normal range of motion, Normal alignment. Musculoskeletal:  Normal ROM, normal strength, no tenderness, no swelling, no deformity. Gastrointestinal:  Soft, Nontender, Non distended, Normal bowel sounds. Neurological:  No focal neurological deficit observed, normal sensory observed, normal motor observed. Lymphatics:  No lymphadenopathy. Psychiatric:  Cooperative, appropriate mood & affect, Not normal judgment,       Medical Decision Making   Notes:  7/20/2022 06:43:53: Reexam, child is sleeping. Very stable. Oxygenating well. Positive for COVID-19. Will discharge home with supportive care. .      Reexamination/ Reevaluation   Vital signs   Basic Oxygen Information   7/20/2022 6:11 EDT Oxygen Therapy Room air    SpO2 100 %   7/20/2022 6:05 EDT Oxygen Therapy Room air    SpO2 100 %         Impression and Plan   Diagnosis   COVID-19 ([id number] U07.1, Discharge, Medical)   Plan   Condition: Improved. Disposition: Discharged: to home. Patient was given the following educational materials:   COVID Discharge Instructions positive results (Custom), Acetaminophen Dosage Chart, Pediatric, Ibuprofen Dosage Chart, Pediatric. Follow up with: Follow up with primary care provider Within 1 week Treat fever with ibuprofen and acetaminophen. Drink plenty of fluids. Follow-up with pediatrician if any further concerns. [facility] if any change or worsening of condition. .    Counseled: Family, Friend, Regarding diagnostic results, Regarding treatment plan. Notes: This note was created using voice recognition software and may contain typographic errors missed during final review. The intent is to have a complete and accurate medical record, though some errors will occur due to the nature of the software and program.    As a valued partner in the safety effort, if you have noticed factual errors, please complete the health information amendment/correct form or call the [address] [place] [address] at [phone].   .    Signature Line     Electronically Signed on 07/20/2022 06:45 AM EDT   ________________________________________________   Tarah HOLLOWAY Modified by: Raffaele HOLLOWAY on 07/20/2022 06:45 AM EDT

## 2022-10-19 NOTE — ED NOTES
ED Triage Note       ED Secondary Triage Entered On:  7/20/2022 6:11 EDT    Performed On:  7/20/2022 6:10 EDT by Armani Wyman 18 Information   Barriers to Learning :   None evident   ED Home Meds Section :   Document assessment   [facility] ED Fall Risk Section :   Not applicable   ED Advance Directives Section :   Document assessment   ED Palliative Screen :   N/A (prefilled for <66yo)   Jeff Carvalho - 7/20/2022 6:10 EDT   (As Of: 7/20/2022 06:11:07 EDT)   Diagnoses(Active)    Fever  Date:   7/20/2022 ; Diagnosis Type:   Reason For Visit ; Confirmation:   Complaint of ; Clinical Dx: Fever ; Classification:   Medical ; Clinical Service:   Emergency medicine ; Code:   PNED ; Probability:   0 ; Diagnosis Code:   [id number]             -    Procedure History   (As Of: 7/20/2022 06:11:07 EDT)     ED Advance Directive   Advance Directive :   No   Bianca STRICKLAND - 7/20/2022 6:10 EDT   Med Hx   Medication List   (As Of: 7/20/2022 06:11:07 EDT)   Home Meds    multivitamin with iron  :   multivitamin with iron ; Status:   Documented ; Ordered As Mnemonic:   Poly-Vi-Sol with Iron Drops ; Simple Display Line:   1 mL, Oral, Daily, 0 Refill(s) ;  Ordering Provider:   Yanci Wei; Catalog Code:   multivitamin with iron ; Order Dt/Tm:   2021 11:20:31 EDT